# Patient Record
Sex: FEMALE | Race: WHITE | Employment: UNEMPLOYED | URBAN - METROPOLITAN AREA
[De-identification: names, ages, dates, MRNs, and addresses within clinical notes are randomized per-mention and may not be internally consistent; named-entity substitution may affect disease eponyms.]

---

## 2024-02-09 ENCOUNTER — APPOINTMENT (OUTPATIENT)
Dept: GENERAL RADIOLOGY | Age: 69
End: 2024-02-09
Payer: COMMERCIAL

## 2024-02-09 ENCOUNTER — HOSPITAL ENCOUNTER (EMERGENCY)
Age: 69
Discharge: HOME | End: 2024-02-09
Attending: EMERGENCY MEDICINE
Payer: COMMERCIAL

## 2024-02-09 VITALS
BODY MASS INDEX: 20.77 KG/M2 | RESPIRATION RATE: 16 BRPM | DIASTOLIC BLOOD PRESSURE: 82 MMHG | HEIGHT: 61 IN | WEIGHT: 110 LBS | HEART RATE: 67 BPM | SYSTOLIC BLOOD PRESSURE: 154 MMHG | TEMPERATURE: 98.3 F | OXYGEN SATURATION: 96 %

## 2024-02-09 DIAGNOSIS — M25.552 LEFT HIP PAIN: Primary | ICD-10-CM

## 2024-02-09 PROCEDURE — 73502 X-RAY EXAM HIP UNI 2-3 VIEWS: CPT

## 2024-02-09 PROCEDURE — 99283 EMERGENCY DEPT VISIT LOW MDM: CPT

## 2024-02-09 PROCEDURE — 6370000000 HC RX 637 (ALT 250 FOR IP): Performed by: EMERGENCY MEDICINE

## 2024-02-09 RX ORDER — OXYCODONE HYDROCHLORIDE 5 MG/1
5 TABLET ORAL ONCE
Status: COMPLETED | OUTPATIENT
Start: 2024-02-09 | End: 2024-02-09

## 2024-02-09 RX ORDER — OXYCODONE HYDROCHLORIDE 5 MG/1
5 TABLET ORAL EVERY 6 HOURS PRN
Qty: 12 TABLET | Refills: 0 | Status: SHIPPED | OUTPATIENT
Start: 2024-02-09 | End: 2024-02-12

## 2024-02-09 RX ADMIN — OXYCODONE HYDROCHLORIDE 5 MG: 5 TABLET ORAL at 09:46

## 2024-02-09 ASSESSMENT — ENCOUNTER SYMPTOMS
SORE THROAT: 0
SHORTNESS OF BREATH: 0
DIARRHEA: 0
CONSTIPATION: 0
VOMITING: 0
COUGH: 0
NAUSEA: 0
ABDOMINAL PAIN: 0
EYE PAIN: 0
EYE REDNESS: 0
CHEST TIGHTNESS: 0
BACK PAIN: 0

## 2024-02-09 ASSESSMENT — PAIN DESCRIPTION - ORIENTATION: ORIENTATION: LEFT

## 2024-02-09 ASSESSMENT — LIFESTYLE VARIABLES
HOW MANY STANDARD DRINKS CONTAINING ALCOHOL DO YOU HAVE ON A TYPICAL DAY: PATIENT DOES NOT DRINK
HOW OFTEN DO YOU HAVE A DRINK CONTAINING ALCOHOL: NEVER

## 2024-02-09 ASSESSMENT — PAIN - FUNCTIONAL ASSESSMENT
PAIN_FUNCTIONAL_ASSESSMENT: 0-10
PAIN_FUNCTIONAL_ASSESSMENT: 0-10

## 2024-02-09 ASSESSMENT — PAIN SCALES - GENERAL
PAINLEVEL_OUTOF10: 2
PAINLEVEL_OUTOF10: 9
PAINLEVEL_OUTOF10: 8

## 2024-02-09 ASSESSMENT — PAIN DESCRIPTION - LOCATION: LOCATION: HIP;LEG

## 2024-02-09 ASSESSMENT — PAIN DESCRIPTION - DESCRIPTORS: DESCRIPTORS: ACHING;THROBBING;STABBING

## 2024-02-09 NOTE — ED PROVIDER NOTES
[facility] ED  eMERGENCY dEPARTMENT eNCOUnter    Pt Name: Maria Teresa Chun  MRN: [id number]  Birthdate 1955  Date of evaluation: 2/9/24  CHIEF COMPLAINT       Chief Complaint   Patient presents with    Hip Pain     HISTORY OF PRESENT ILLNESS   HPI  Patient has a long history of hip pain which has been exacerbated for the last 3 days. No clear inciting event, no falls. Pain is greatest over the left lateral hip and buttock, radiates into left lumbar back, left thigh and left groin. She has had xrays which show arthritis, no history of surgeries. She has not taken any otc medications for this pain, states she doesn't think she can take either motrin or tylenol due to past reactions.   Pain is worst with sitting and laying.   Patient is new to the area and has a first appointment coming up with a new PCP.     REVIEW OF SYSTEMS     Review of Systems   Constitutional:  Negative for chills and fever.   HENT:  Negative for congestion, ear pain and sore throat.    Eyes:  Negative for pain, redness and visual disturbance.   Respiratory:  Negative for cough, chest tightness and shortness of breath.    Cardiovascular:  Negative for chest pain and palpitations.   Gastrointestinal:  Negative for abdominal pain, constipation, diarrhea, nausea and vomiting.   Genitourinary:  Negative for dysuria and vaginal discharge.   Musculoskeletal:  Positive for arthralgias. Negative for back pain and neck pain.   Skin:  Negative for rash and wound.   Neurological:  Negative for seizures, syncope and headaches.     PASTMEDICAL HISTORY     Past Medical History:   Diagnosis Date    Arthritis     Depression      SURGICAL HISTORY       Past Surgical History:   Procedure Laterality Date    TUBAL LIGATION       CURRENT MEDICATIONS       Discharge Medication List as of 2/9/2024 11:29 AM        ALLERGIES     is allergic to advil [ibuprofen] and tylenol [acetaminophen].  FAMILY HISTORY     has no family status information on file.     severe OA, evidence of calcified fibroid; I discussed this with the patient and she does have a history of fibroids. No evidence of pathologic fracture or obvious lytic lesion noted on exam. Pain improved with oxycodone. Patient has upcoming PCP appointment already scheduled, we will dc home with a short course of oxycodone for pain control. Dicussed safe use of narcotics with her including using as least frequently as possible, watching for lightheadedness or falls at home. Dc home.       CRITICAL CARE:       PROCEDURES:    Procedures    DIAGNOSTIC RESULTS   EKG: All EKG's are interpreted by the Emergency Department Physician who either signs or Co-signs this chart inthe absence of a cardiologist.      RADIOLOGY:All plain film, CT, MRI, and formal ultrasound images (except ED bedside ultrasound) are read by the radiologist, see reports below, unless otherwise noted in MDM or here.  XR HIP LEFT (2-3 VIEWS)   Final Result   1. Severe left hip osteoarthrosis.   2. No acute fracture or dislocation.           LABS: All lab results were reviewed by myself, and all abnormals are listed below.  Labs Reviewed - No data to display  EMERGENCY DEPARTMENT COURSE:   Vitals:    Vitals:    02/09/24 0900 02/09/24 1100   BP: (!) 172/89 (!) 154/82   Pulse: 87 67   Resp: 17 16   Temp: 98.3 °F (36.8 °C)    TempSrc: Oral    SpO2: 97% 96%   Weight: 49.9 kg (110 lb)    Height: 1.549 m (5' 1\")        The patient was given the following medications while in the emergency department:  Orders Placed This Encounter   Medications    oxyCODONE (ROXICODONE) immediate release tablet 5 mg    oxyCODONE (ROXICODONE) 5 MG immediate release tablet     Sig: Take 1 tablet by mouth every 6 hours as needed for Pain for up to 3 days. Intended supply: 3 days. Take lowest dose possible to manage pain Max Daily Amount: 20 mg     Dispense:  12 tablet     Refill:  0     CONSULTS:  None    FINAL IMPRESSION      1. Left hip pain          DISPOSITION/PLAN

## 2024-02-11 ENCOUNTER — HOSPITAL ENCOUNTER (EMERGENCY)
Age: 69
Discharge: HOME | End: 2024-02-11
Attending: EMERGENCY MEDICINE
Payer: COMMERCIAL

## 2024-02-11 VITALS
RESPIRATION RATE: 18 BRPM | BODY MASS INDEX: 20.77 KG/M2 | TEMPERATURE: 97.5 F | OXYGEN SATURATION: 97 % | WEIGHT: 110 LBS | SYSTOLIC BLOOD PRESSURE: 160 MMHG | DIASTOLIC BLOOD PRESSURE: 104 MMHG | HEART RATE: 78 BPM | HEIGHT: 61 IN

## 2024-02-11 DIAGNOSIS — B02.9 HERPES ZOSTER WITHOUT COMPLICATION: ICD-10-CM

## 2024-02-11 DIAGNOSIS — R21 RASH: Primary | ICD-10-CM

## 2024-02-11 PROCEDURE — 99283 EMERGENCY DEPT VISIT LOW MDM: CPT

## 2024-02-11 RX ORDER — VALACYCLOVIR HYDROCHLORIDE 1 G/1
1000 TABLET, FILM COATED ORAL 3 TIMES DAILY
Qty: 21 TABLET | Refills: 0 | Status: SHIPPED | OUTPATIENT
Start: 2024-02-11 | End: 2024-02-18

## 2024-02-11 NOTE — ED PROVIDER NOTES
IMPRESSION      1. Rash    2. Herpes zoster without complication          DISPOSITION/PLAN   DISPOSITION Decision To Discharge 02/11/2024 09:13:03 AM      OUTPATIENT FOLLOW UP THE PATIENT:  PCP  [id number]  Schedule an appointment as soon as possible for a visit in 2 days      [place] [id number] [phone]  Go to   As needed, If symptoms worsen      DO Tulio Herron Sami, DO  02/11/24 [id number]